# Patient Record
Sex: FEMALE | Race: WHITE | URBAN - METROPOLITAN AREA
[De-identification: names, ages, dates, MRNs, and addresses within clinical notes are randomized per-mention and may not be internally consistent; named-entity substitution may affect disease eponyms.]

---

## 2024-08-19 ENCOUNTER — OFFICE VISIT (OUTPATIENT)
Dept: DENTISTRY | Facility: CLINIC | Age: 49
End: 2024-08-19

## 2024-08-19 VITALS — DIASTOLIC BLOOD PRESSURE: 77 MMHG | SYSTOLIC BLOOD PRESSURE: 124 MMHG | HEART RATE: 77 BPM | TEMPERATURE: 98.6 F

## 2024-08-19 DIAGNOSIS — Z01.20 ENCOUNTER FOR DENTAL EXAMINATION: Primary | ICD-10-CM

## 2024-08-19 PROCEDURE — D0140 LIMITED ORAL EVALUATION - PROBLEM FOCUSED: HCPCS | Performed by: DENTIST

## 2024-08-19 PROCEDURE — D0270 BITEWING - SINGLE RADIOGRAPHIC IMAGE: HCPCS

## 2024-08-19 PROCEDURE — D0220 INTRAORAL - PERIAPICAL FIRST RADIOGRAPHIC IMAGE: HCPCS

## 2024-08-19 NOTE — DENTAL PROCEDURE DETAILS
Subjective   Patient ID: Niki Nelson is a 49 y.o. female.  Chief Complaint   Patient presents with    Emergency/limited Exam     Reviewed medical history   ASA 1    Translation used  [id number]    CC   PAIN LL # 20  she had a filling placed approx 3 mos ago in [place]   The filling has now come out since she has been in [place] and has been bothering her for about 2 mos.  1 BW and 1 PAX taken  EXAM  DR SHEETS    Advised patient to return for natasha ( any provider ASAP) to place Class V natasha # 20 DF  Advised to return for comp exam and FMX   she has a temp bridge UR canine/premolar  area    Placed Fl2 varnish on # 20 21 site  advised no harder food for a few hours  NV  natasha # 20 DF  NV 2  comp exam fmx

## 2024-08-22 ENCOUNTER — OFFICE VISIT (OUTPATIENT)
Dept: OBGYN CLINIC | Facility: CLINIC | Age: 49
End: 2024-08-22

## 2024-08-22 ENCOUNTER — OFFICE VISIT (OUTPATIENT)
Dept: FAMILY MEDICINE CLINIC | Facility: CLINIC | Age: 49
End: 2024-08-22

## 2024-08-22 VITALS
HEIGHT: 63 IN | HEART RATE: 73 BPM | WEIGHT: 162.2 LBS | DIASTOLIC BLOOD PRESSURE: 80 MMHG | BODY MASS INDEX: 28.74 KG/M2 | SYSTOLIC BLOOD PRESSURE: 133 MMHG

## 2024-08-22 VITALS
TEMPERATURE: 97.8 F | BODY MASS INDEX: 29.81 KG/M2 | SYSTOLIC BLOOD PRESSURE: 124 MMHG | WEIGHT: 162 LBS | DIASTOLIC BLOOD PRESSURE: 72 MMHG | RESPIRATION RATE: 18 BRPM | HEART RATE: 78 BPM | HEIGHT: 62 IN | OXYGEN SATURATION: 98 %

## 2024-08-22 DIAGNOSIS — T83.32XA INTRAUTERINE CONTRACEPTIVE DEVICE THREADS LOST, INITIAL ENCOUNTER: Primary | ICD-10-CM

## 2024-08-22 DIAGNOSIS — Z76.89 ENCOUNTER TO ESTABLISH CARE: ICD-10-CM

## 2024-08-22 DIAGNOSIS — Z59.9 FINANCIAL DIFFICULTIES: ICD-10-CM

## 2024-08-22 DIAGNOSIS — Z02.4 DRIVER'S PERMIT PE (PHYSICAL EXAMINATION): Primary | ICD-10-CM

## 2024-08-22 PROBLEM — E66.3 OVERWEIGHT: Status: ACTIVE | Noted: 2024-08-22

## 2024-08-22 PROCEDURE — 99203 OFFICE O/P NEW LOW 30 MIN: CPT | Performed by: INTERNAL MEDICINE

## 2024-08-22 PROCEDURE — 99203 OFFICE O/P NEW LOW 30 MIN: CPT | Performed by: OBSTETRICS & GYNECOLOGY

## 2024-08-22 RX ORDER — DICLOFENAC SODIUM 25 MG/1
25 TABLET, DELAYED RELEASE ORAL AS NEEDED
COMMUNITY

## 2024-08-22 SDOH — ECONOMIC STABILITY - INCOME SECURITY: PROBLEM RELATED TO HOUSING AND ECONOMIC CIRCUMSTANCES, UNSPECIFIED: Z59.9

## 2024-08-22 NOTE — ASSESSMENT & PLAN NOTE
She reports that she has not yet attempted to have the IUD remove  Strings were not seen on exam  She will return for attempted removal in office with paracervical block

## 2024-08-22 NOTE — PROGRESS NOTES
Ambulatory Visit  Name: Niki Nelson      : 1975      MRN: [id number]  Encounter Provider: Homero Baldwin MD  Encounter Date: 2024   [facility]    Assessment & Plan   1. 's permit PE (physical examination)  Assessment & Plan:  Patient reports today for a 's license/permit physical.     Patient does not have any of the following that would prevent control of a motor vehicle: Neurological disorders, neuropsychiatric disorders, circulatory disorder, cardiac disorder, hypertension, uncontrolled epilepsy, uncontrolled diabetes, cognitive impairment, alcohol abuse, drug abuse, conditions causing repeated lapses of consciousness (e.g. Epilepsy, narcolepsy, hysteria, etc).     Advised to report back to [facility] immediately if any new conditions or limitations develop. Discussed importance of seat belt safety for  and all passengers in the car. Discussed importance of patient’s knowledge of car seat and booster seat use when applicable. Advised not to use alcohol, drugs, or substances which inhibit ability to operate a vehicle. Do not use cell phone or other devices which can distract while operating a vehicle. Reviewed importance of familiarizing one's self with the rules and regulations outlined in drivers manual.      Form filled out, signed, and handed back to the patient.    2. Encounter to establish care    Patient to return to clinic for annual physical     History of Present Illness   {Disappearing Hyperlinks I Encounters * My Last Note * Since Last Visit * History :49192}  Niki Nelson is a 49 y.o. female with no significant past medical history presenting to the clinic for a 's permit physical.  Patient has no acute complaints today.  She has recently established with a gynecologist.  Other symptoms endorsed or denied per ROS.    [employer]  [place]  "#[id number]      Review of Systems   Constitutional:  Negative for chills, fatigue and fever.   HENT: Negative.     Eyes: Negative.    Respiratory:  Negative for cough, shortness of breath and wheezing.    Cardiovascular:  Negative for chest pain and palpitations.   Gastrointestinal: Negative.    Genitourinary: Negative.    Musculoskeletal:  Negative for arthralgias and back pain.   Skin: Negative.    Neurological:  Negative for dizziness, seizures, syncope, weakness, numbness and headaches.   All other systems reviewed and are negative.    No past medical history on file.  Past Surgical History:   Procedure Laterality Date   •  SECTION     • WRIST FRACTURE SURGERY Left      No family history on file.  Social History     Tobacco Use   • Smoking status: Never     Passive exposure: Never   • Smokeless tobacco: Never   Vaping Use   • Vaping status: Never Used   Substance and Sexual Activity   • Alcohol use: Never   • Drug use: Never   • Sexual activity: Yes     Partners: Male     Birth control/protection: I.U.D.     Current Outpatient Medications on File Prior to Visit   Medication Sig   • diclofenac sodium (VOLTAREN) 25 MG EC tablet Take 25 mg by mouth if needed     No Known Allergies    There is no immunization history on file for this patient.  Objective   {Disappearing Hyperlinks   Review Vitals * Enter New Vitals * Results Review * Labs * Imaging * Cardiology * Procedures * Lung Cancer Screening :70755}  /72 (BP Location: Right arm, Patient Position: Sitting, Cuff Size: Standard)   Pulse 78   Temp 97.8 °F (36.6 °C) (Temporal)   Resp 18   Ht 5' 2\" (1.575 m)   Wt 73.5 kg (162 lb)   LMP 2024 (Exact Date)   SpO2 98%   BMI 29.63 kg/m²     Physical Exam  Vitals and nursing note reviewed.   Constitutional:       General: She is not in acute distress.     Appearance: Normal appearance. She is well-developed. She is not ill-appearing.   HENT:      Head: Normocephalic and atraumatic.      " Right Ear: External ear normal.      Left Ear: External ear normal.      Nose: Nose normal.      Mouth/Throat:      Mouth: Mucous membranes are moist.      Pharynx: No oropharyngeal exudate.   Eyes:      Extraocular Movements: Extraocular movements intact.      Conjunctiva/sclera: Conjunctivae normal.      Pupils: Pupils are equal, round, and reactive to light.   Cardiovascular:      Rate and Rhythm: Normal rate and regular rhythm.      Heart sounds: No murmur heard.  Pulmonary:      Effort: Pulmonary effort is normal. No respiratory distress.      Breath sounds: Normal breath sounds.   Abdominal:      General: There is no distension.      Palpations: Abdomen is soft.      Tenderness: There is no abdominal tenderness.   Musculoskeletal:         General: No swelling. Normal range of motion.      Cervical back: Normal range of motion and neck supple.   Skin:     General: Skin is warm and dry.      Capillary Refill: Capillary refill takes less than 2 seconds.   Neurological:      General: No focal deficit present.      Mental Status: She is alert and oriented to person, place, and time.   Psychiatric:         Mood and Affect: Mood normal.         Behavior: Behavior normal.

## 2024-08-22 NOTE — PROGRESS NOTES
"Subjective:     Niki Nelson is a 49 y.o. female who presents with IUD in place for 15 years. She is not sure if it is hormonal or not but was told it last 10 years. She had picture of her ultrasound on her phone that was take a few months ago that showed the IUD in her lower uterine segment. She reports intermittent pain during sexual intercourse but otherwise is not having pain. She does have monthly periods with the IUD.    Objective:    Vitals: Blood pressure 133/80, pulse 73, height 5' 2.99\" (1.6 m), weight 73.6 kg (162 lb 3.2 oz), last menstrual period 08/02/2024.Body mass index is 28.74 kg/m².    Physical Exam  Constitutional:       Appearance: She is well-developed.   Genitourinary:      Right Labia: No rash or tenderness.     Left Labia: No tenderness or rash.     No vaginal discharge or bleeding.        Left Adnexa: not tender.     No cervical friability or lesion.      No IUD strings visualized.   Cardiovascular:      Rate and Rhythm: Normal rate and regular rhythm.      Heart sounds: Normal heart sounds. No murmur heard.     No friction rub. No gallop.   Pulmonary:      Effort: Pulmonary effort is normal. No respiratory distress.      Breath sounds: No wheezing.   Abdominal:      Palpations: Abdomen is soft.      Tenderness: There is no abdominal tenderness.   Musculoskeletal:         General: No tenderness.   Neurological:      Mental Status: She is alert and oriented to person, place, and time.   Vitals reviewed.         Assessment/Plan:    Problem List Items Addressed This Visit       IUD threads lost - Primary     She reports that she has not yet attempted to have the IUD remove  Strings were not seen on exam  She will return for attempted removal in office with paracervical block          Other Visit Diagnoses       Financial difficulties        Relevant Orders    Ambulatory referral to social work care management program              Saba Bassett MD  8/22/2024  11:45 AM        "

## 2024-08-22 NOTE — ASSESSMENT & PLAN NOTE
Patient reports today for a 's license/permit physical.     Patient does not have any of the following that would prevent control of a motor vehicle: Neurological disorders, neuropsychiatric disorders, circulatory disorder, cardiac disorder, hypertension, uncontrolled epilepsy, uncontrolled diabetes, cognitive impairment, alcohol abuse, drug abuse, conditions causing repeated lapses of consciousness (e.g. Epilepsy, narcolepsy, hysteria, etc).     Advised to report back to [facility] immediately if any new conditions or limitations develop. Discussed importance of seat belt safety for  and all passengers in the car. Discussed importance of patient’s knowledge of car seat and booster seat use when applicable. Advised not to use alcohol, drugs, or substances which inhibit ability to operate a vehicle. Do not use cell phone or other devices which can distract while operating a vehicle. Reviewed importance of familiarizing one's self with the rules and regulations outlined in drivers manual.      Form filled out, signed, and handed back to the patient.

## 2024-08-23 ENCOUNTER — PATIENT OUTREACH (OUTPATIENT)
Dept: OBGYN CLINIC | Facility: CLINIC | Age: 49
End: 2024-08-23

## 2024-08-26 ENCOUNTER — PATIENT OUTREACH (OUTPATIENT)
Dept: OBGYN CLINIC | Facility: CLINIC | Age: 49
End: 2024-08-26

## 2024-08-26 NOTE — PROGRESS NOTES
KULDIP MINOR spoke with 48 y/o-M-P3-  Italian speaking woman to address her needs. KULDIP MINOR introduced self and discussed the reason for the call. Pt resides with her spouse and 3 kids. Pt just started working part time and both her spouse and oldest son are looking for a job. Pt does have some financial concern. Pt reported she is participating of the local food [employer]. Pt denies other concerns at this time. KULDIP MINOR provided phone number for DANDY. KULDIP MINOR provided contact information and encouraged Pt to call at any time needed.

## 2024-08-30 ENCOUNTER — OFFICE VISIT (OUTPATIENT)
Dept: DENTISTRY | Facility: CLINIC | Age: 49
End: 2024-08-30

## 2024-08-30 VITALS — SYSTOLIC BLOOD PRESSURE: 114 MMHG | HEART RATE: 82 BPM | DIASTOLIC BLOOD PRESSURE: 76 MMHG | TEMPERATURE: 98.4 F

## 2024-08-30 DIAGNOSIS — K02.9 SECONDARY DENTAL CARIES ASSOCIATED WITH FAILED OR DEFECTIVE DENTAL RESTORATION: Primary | ICD-10-CM

## 2024-08-30 DIAGNOSIS — K08.50 SECONDARY DENTAL CARIES ASSOCIATED WITH FAILED OR DEFECTIVE DENTAL RESTORATION: Primary | ICD-10-CM

## 2024-08-30 PROCEDURE — D2391 RESIN-BASED COMPOSITE - 1 SURFACE, POSTERIOR: HCPCS | Performed by: DENTIST

## 2024-08-30 NOTE — DENTAL PROCEDURE DETAILS
"Composite Restoration #20 F    Niki Nelson 49 y.o. female presents with self to [facility] for composite restoration  PMH reviewed, no changes, ASA II. Significant medical history: see list. Significant allergies: denies. Significant medications: see list.  Pain Level: 3/10  Chief complain: \"Filling was done in [place] 3 months ago. The filling fell out 2 months ago and tooth is sensitive\"   Diagnosis: Tooth #20 lost composite filling facial cervical. Hypersensitivity. Possible need for RCT.   Radiograph: current PA x-ray #20.   Consent:  Risks of specific procedure: need for RCT if pulp exposure occurs or in future if pulp is inflamed, need to revise tx plan based on extent of decay, damage to adjacent tooth and/or restoration.  Risks of any dental procedure: post procedural pain or sensitivity, local anesthetic side effects, allergic reaction to dental materials and medications, breakage of local anesthetic needle, aspiration of small dental tools, injury to nearby hard and soft tissues and anatomical structures.  Benefits: prevent further breakdown of tooth and its sequelae.  Alternatives: no tx.  Tx plan for composite restoration #20 F reviewed. Opportunity to ask questions given, all questions answered to degree of medical and dental certainty.  Patient understands and consent given by self via verbal consent.  Anesthesia:  Topical 20% benzocaine.  One half carps 2% Lidocaine 1:100k epi via mental nerve block.  Procedure details:  Isolation: cotton rolls, dry angles, and high volume suction  Prepped tooth #20 class V facial cervical  with high speed handpiece.  Caries removed with round carbide on slow speed.  Band placement: not applicable/needed for this restoration.   Etch with 37% H2PO4 15 seconds. Rinsed and suctioned.  Applied  with 20 second scrub, air dried, and light cured.  Restored with packable (A2 shade) and light cured.  Checked occlusion and adjusted with finishing burs.  Checked " Px rounded on. Px states chest pain down to 2/10.    contacts with floss  Polished with enhance point.  Verified occlusion and contacts.  POI is given. Informed patient if symptoms persist, then RCT will be indicted. Patient approved.   Reviewed oral hygiene and need for recall visits.   Patient dismissed ambulatory and alert.  NV: COMP and FMX.

## 2024-09-12 ENCOUNTER — PROCEDURE VISIT (OUTPATIENT)
Dept: OBGYN CLINIC | Facility: CLINIC | Age: 49
End: 2024-09-12

## 2024-09-12 VITALS
SYSTOLIC BLOOD PRESSURE: 116 MMHG | DIASTOLIC BLOOD PRESSURE: 79 MMHG | HEIGHT: 62 IN | WEIGHT: 161.8 LBS | HEART RATE: 73 BPM | BODY MASS INDEX: 29.77 KG/M2

## 2024-09-12 DIAGNOSIS — T83.32XD INTRAUTERINE CONTRACEPTIVE DEVICE THREADS LOST, SUBSEQUENT ENCOUNTER: Primary | ICD-10-CM

## 2024-09-12 DIAGNOSIS — Z59.9 FINANCIAL DIFFICULTIES: ICD-10-CM

## 2024-09-12 DIAGNOSIS — Z59.41 FOOD INSECURITY: ICD-10-CM

## 2024-09-12 DIAGNOSIS — Z59.819 HOUSING INSTABILITY: ICD-10-CM

## 2024-09-12 PROCEDURE — 58301 REMOVE INTRAUTERINE DEVICE: CPT

## 2024-09-12 RX ORDER — LIDOCAINE HYDROCHLORIDE 10 MG/ML
10 INJECTION, SOLUTION EPIDURAL; INFILTRATION; INTRACAUDAL; PERINEURAL ONCE
Status: COMPLETED | OUTPATIENT
Start: 2024-09-12 | End: 2024-09-12

## 2024-09-12 RX ADMIN — LIDOCAINE HYDROCHLORIDE 10 ML: 10 INJECTION, SOLUTION EPIDURAL; INFILTRATION; INTRACAUDAL; PERINEURAL at 10:09

## 2024-09-12 SDOH — ECONOMIC STABILITY - FOOD INSECURITY: FOOD INSECURITY: Z59.41

## 2024-09-12 SDOH — ECONOMIC STABILITY - HOUSING INSECURITY: HOUSING INSTABILITY UNSPECIFIED: Z59.819

## 2024-09-12 SDOH — ECONOMIC STABILITY - INCOME SECURITY: PROBLEM RELATED TO HOUSING AND ECONOMIC CIRCUMSTANCES, UNSPECIFIED: Z59.9

## 2024-09-12 NOTE — PROGRESS NOTES
"Subjective:     Niki Nelson is a 49 y.o.  female who presents for IUD removal. She has had the IUD in place for approximately 15yrs. She does have regular menstrual cycles. She denies any pain from the IUD.    Objective:    Vitals: Blood pressure 116/79, pulse 73, height 5' 2\" (1.575 m), weight 73.4 kg (161 lb 12.8 oz), last menstrual period 2024.Body mass index is 29.59 kg/m².    Physical Exam  Constitutional:       Appearance: She is well-developed.   Cardiovascular:      Rate and Rhythm: Normal rate and regular rhythm.      Heart sounds: Normal heart sounds. No murmur heard.     No friction rub. No gallop.   Pulmonary:      Effort: Pulmonary effort is normal. No respiratory distress.      Breath sounds: No wheezing.   Abdominal:      Palpations: Abdomen is soft.      Tenderness: There is no abdominal tenderness.   Musculoskeletal:         General: No tenderness.   Neurological:      Mental Status: She is alert and oriented to person, place, and time.   Vitals reviewed.       IUD Procedure    Date/Time: 2024 8:40 AM    Performed by: Saba Bassett MD  Authorized by: Saba Bassett MD    Verbal consent obtained?: Yes    Risks and benefits: Risks, benefits and alternatives were discussed    Consent given by:  Patient  Required items: Required blood products, implants, devices and special equipment available    Patient identity confirmed:  Verbally with patient  Select procedure: IUD removal    IUD Removal:     Reason for removal: patient request      Removed without complications: no      Complications:  Unable to be removed.    Tenaculum/Allis/Ring Forceps applied to cervix: yes      Strings visualized: no      Performed with ultrasound guidance: yes    Removal Comments:      Paracervical block performed with 10cc of 1% lidocaine  Under ultrasound guidance IUD appeared at the fundus. IUD string were unable to be teased out of the cervical canal and the IUD was unable to be grasped. "       Assessment/Plan:    Problem List Items Addressed This Visit       IUD threads lost    Relevant Medications    lidocaine (PF) (XYLOCAINE-MPF) 1 % injection 10 mL (Completed)    Other Relevant Orders    US pelvis complete w transvaginal     Other Visit Diagnoses       Financial difficulties    -  Primary    Relevant Orders    Ambulatory referral to social work care management program    Food insecurity        Relevant Orders    Ambulatory referral to social work care management program    Housing instability        Relevant Orders    Ambulatory referral to social work care management program              Saba Bassett MD  9/12/2024  11:40 AM

## 2024-09-12 NOTE — LETTER
September 12, 2024     Patient: Niki Nelson  YOB: 1975  Date of Visit: 9/12/2024      To Whom it May Concern:    Niki Nelson is under my professional care. Niki was seen in my office on 9/12/2024. Niki may return to work on 9/13/24 .    If you have any questions or concerns, please don't hesitate to call.         Sincerely,          Saba Bassett MD        CC: No Recipients

## 2024-09-18 ENCOUNTER — PATIENT OUTREACH (OUTPATIENT)
Dept: OBGYN CLINIC | Facility: CLINIC | Age: 49
End: 2024-09-18

## 2024-09-19 ENCOUNTER — PATIENT OUTREACH (OUTPATIENT)
Dept: OBGYN CLINIC | Facility: CLINIC | Age: 49
End: 2024-09-19

## 2024-09-21 PROBLEM — Z02.4 DRIVER'S PERMIT PE (PHYSICAL EXAMINATION): Status: RESOLVED | Noted: 2024-08-22 | Resolved: 2024-09-21

## 2024-10-01 ENCOUNTER — TELEPHONE (OUTPATIENT)
Dept: OBGYN CLINIC | Facility: CLINIC | Age: 49
End: 2024-10-01